# Patient Record
Sex: MALE | Race: WHITE | Employment: FULL TIME | URBAN - NONMETROPOLITAN AREA
[De-identification: names, ages, dates, MRNs, and addresses within clinical notes are randomized per-mention and may not be internally consistent; named-entity substitution may affect disease eponyms.]

---

## 2019-02-21 ENCOUNTER — HOSPITAL ENCOUNTER (EMERGENCY)
Age: 19
Discharge: HOME | End: 2019-02-21

## 2019-02-21 VITALS
BODY MASS INDEX: 24.16 KG/M2 | TEMPERATURE: 98.5 F | HEIGHT: 65 IN | DIASTOLIC BLOOD PRESSURE: 84 MMHG | OXYGEN SATURATION: 98 % | HEART RATE: 88 BPM | SYSTOLIC BLOOD PRESSURE: 137 MMHG | WEIGHT: 145 LBS | RESPIRATION RATE: 15 BRPM

## 2019-02-21 DIAGNOSIS — L02.416 ABSCESS OF LEFT THIGH: Primary | ICD-10-CM

## 2019-02-21 PROCEDURE — 99282 EMERGENCY DEPT VISIT SF MDM: CPT

## 2019-02-21 PROCEDURE — 87070 CULTURE OTHR SPECIMN AEROBIC: CPT

## 2019-02-21 PROCEDURE — 10061 I&D ABSCESS COMP/MULTIPLE: CPT | Performed by: NURSE PRACTITIONER

## 2019-02-21 PROCEDURE — 87075 CULTR BACTERIA EXCEPT BLOOD: CPT

## 2019-02-21 PROCEDURE — 99283 EMERGENCY DEPT VISIT LOW MDM: CPT | Performed by: NURSE PRACTITIONER

## 2019-02-21 PROCEDURE — 87205 SMEAR GRAM STAIN: CPT

## 2019-02-21 PROCEDURE — 10060 I&D ABSCESS SIMPLE/SINGLE: CPT

## 2019-02-21 RX ORDER — SULFAMETHOXAZOLE AND TRIMETHOPRIM 800; 160 MG/1; MG/1
1 TABLET ORAL 2 TIMES DAILY
Qty: 20 TABLET | Refills: 0 | Status: SHIPPED | OUTPATIENT
Start: 2019-02-21 | End: 2019-03-03

## 2019-02-21 RX ORDER — HYDROCODONE BITARTRATE AND ACETAMINOPHEN 5; 325 MG/1; MG/1
1 TABLET ORAL EVERY 6 HOURS PRN
Qty: 10 TABLET | Refills: 0 | Status: SHIPPED | OUTPATIENT
Start: 2019-02-21 | End: 2019-02-24

## 2019-02-21 ASSESSMENT — PAIN DESCRIPTION - LOCATION: LOCATION: GROIN

## 2019-02-21 ASSESSMENT — PAIN DESCRIPTION - PAIN TYPE: TYPE: ACUTE PAIN

## 2019-02-21 ASSESSMENT — PAIN DESCRIPTION - ORIENTATION: ORIENTATION: LEFT

## 2019-02-21 ASSESSMENT — ENCOUNTER SYMPTOMS: VOMITING: 0

## 2019-02-21 ASSESSMENT — PAIN SCALES - GENERAL: PAINLEVEL_OUTOF10: 7

## 2019-02-25 LAB
GRAM STAIN RESULT: ABNORMAL
WOUND/ABSCESS: ABNORMAL

## 2023-06-04 ENCOUNTER — HOSPITAL ENCOUNTER (EMERGENCY)
Facility: HOSPITAL | Age: 23
Discharge: HOME | End: 2023-06-04
Attending: EMERGENCY MEDICINE | Admitting: FAMILY MEDICINE

## 2023-06-04 VITALS
HEIGHT: 66 IN | BODY MASS INDEX: 26.03 KG/M2 | TEMPERATURE: 98.3 F | RESPIRATION RATE: 18 BRPM | OXYGEN SATURATION: 97 % | SYSTOLIC BLOOD PRESSURE: 111 MMHG | HEART RATE: 75 BPM | DIASTOLIC BLOOD PRESSURE: 63 MMHG | WEIGHT: 162 LBS

## 2023-06-04 DIAGNOSIS — T22.212A PARTIAL THICKNESS BURN OF LEFT FOREARM, INITIAL ENCOUNTER: ICD-10-CM

## 2023-06-04 DIAGNOSIS — T24.202A PARTIAL THICKNESS BURN OF LEFT LOWER EXTREMITY, INITIAL ENCOUNTER: Primary | ICD-10-CM

## 2023-06-04 PROCEDURE — 90471 IMMUNIZATION ADMIN: CPT | Performed by: NURSE PRACTITIONER

## 2023-06-04 PROCEDURE — 63710000001 ONDANSETRON ODT 4 MG TABLET DISPERSIBLE: Performed by: NURSE PRACTITIONER

## 2023-06-04 PROCEDURE — 90715 TDAP VACCINE 7 YRS/> IM: CPT | Performed by: NURSE PRACTITIONER

## 2023-06-04 PROCEDURE — 99283 EMERGENCY DEPT VISIT LOW MDM: CPT

## 2023-06-04 PROCEDURE — 96372 THER/PROPH/DIAG INJ SC/IM: CPT

## 2023-06-04 PROCEDURE — 0 HYDROMORPHONE 1 MG/ML SOLUTION: Performed by: NURSE PRACTITIONER

## 2023-06-04 PROCEDURE — 25010000002 TETANUS-DIPHTH-ACELL PERTUSSIS 5-2.5-18.5 LF-MCG/0.5 SUSPENSION PREFILLED SYRINGE: Performed by: NURSE PRACTITIONER

## 2023-06-04 RX ORDER — CLINDAMYCIN HYDROCHLORIDE 300 MG/1
300 CAPSULE ORAL 4 TIMES DAILY
Qty: 40 CAPSULE | Refills: 0 | Status: SHIPPED | OUTPATIENT
Start: 2023-06-04 | End: 2023-06-14

## 2023-06-04 RX ORDER — BACITRACIN ZINC AND POLYMYXIN B SULFATE 500; 10000 [USP'U]/G; [USP'U]/G
1 OINTMENT TOPICAL ONCE
Status: COMPLETED | OUTPATIENT
Start: 2023-06-04 | End: 2023-06-04

## 2023-06-04 RX ORDER — ONDANSETRON 4 MG/1
4 TABLET, ORALLY DISINTEGRATING ORAL ONCE
Status: COMPLETED | OUTPATIENT
Start: 2023-06-04 | End: 2023-06-04

## 2023-06-04 RX ORDER — BACITRACIN ZINC AND POLYMYXIN B SULFATE 500; 1000 [USP'U]/G; [USP'U]/G
1 OINTMENT TOPICAL 2 TIMES DAILY
Qty: 30 G | Refills: 0 | Status: SHIPPED | OUTPATIENT
Start: 2023-06-04

## 2023-06-04 RX ORDER — OXYCODONE HYDROCHLORIDE AND ACETAMINOPHEN 5; 325 MG/1; MG/1
1 TABLET ORAL EVERY 4 HOURS PRN
Qty: 15 TABLET | Refills: 0 | Status: SHIPPED | OUTPATIENT
Start: 2023-06-04

## 2023-06-04 RX ADMIN — TETANUS TOXOID, REDUCED DIPHTHERIA TOXOID AND ACELLULAR PERTUSSIS VACCINE, ADSORBED 0.5 ML: 5; 2.5; 8; 8; 2.5 SUSPENSION INTRAMUSCULAR at 11:58

## 2023-06-04 RX ADMIN — HYDROMORPHONE HYDROCHLORIDE 1 MG: 1 INJECTION, SOLUTION INTRAMUSCULAR; INTRAVENOUS; SUBCUTANEOUS at 11:57

## 2023-06-04 RX ADMIN — ONDANSETRON 4 MG: 4 TABLET, ORALLY DISINTEGRATING ORAL at 11:57

## 2023-06-04 RX ADMIN — BACITRACIN ZINC AND POLYMYXIN B SULFATE 1 APPLICATION: at 13:25

## 2023-06-04 NOTE — DISCHARGE INSTRUCTIONS
Clean daily; apply polysporin to the open wound, xeroform gauze and kerlix wrap, keep dry    F/u with [facility] on Tuesday 6/6/23 at 9:00 am (they want you at the clinic by 8:45
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
## 2023-06-04 NOTE — ED NOTES
Wound irrigated with sterile water. Pt tolerated this procedure well. Pts pain remains the same. Will continue to monitor at this time.

## 2023-06-04 NOTE — Clinical Note
[facility] EMERGENCY DEPARTMENT  [facility] [facility] [address]  Phone: [phone]    Yosi Her was seen and treated in our emergency department on 6/4/2023.  He may return to work on 06/07/2023.         Thank you for choosing [facility].    Emerita Chatman, APRN

## 2024-02-26 ENCOUNTER — HOSPITAL ENCOUNTER (EMERGENCY)
Facility: HOSPITAL | Age: 24
Discharge: HOME | End: 2024-02-26
Attending: EMERGENCY MEDICINE

## 2024-02-26 VITALS
WEIGHT: 162 LBS | BODY MASS INDEX: 26.03 KG/M2 | RESPIRATION RATE: 20 BRPM | SYSTOLIC BLOOD PRESSURE: 127 MMHG | OXYGEN SATURATION: 96 % | TEMPERATURE: 98.6 F | DIASTOLIC BLOOD PRESSURE: 88 MMHG | HEIGHT: 66 IN | HEART RATE: 91 BPM

## 2024-02-26 DIAGNOSIS — S61.211A LACERATION OF LEFT INDEX FINGER WITHOUT FOREIGN BODY WITHOUT DAMAGE TO NAIL, INITIAL ENCOUNTER: Primary | ICD-10-CM

## 2024-02-26 PROCEDURE — 99282 EMERGENCY DEPT VISIT SF MDM: CPT

## 2024-02-26 PROCEDURE — 90715 TDAP VACCINE 7 YRS/> IM: CPT | Performed by: EMERGENCY MEDICINE

## 2024-02-26 PROCEDURE — 25010000002 TETANUS-DIPHTH-ACELL PERTUSSIS 5-2.5-18.5 LF-MCG/0.5 SUSPENSION PREFILLED SYRINGE: Performed by: EMERGENCY MEDICINE

## 2024-02-26 PROCEDURE — 90471 IMMUNIZATION ADMIN: CPT | Performed by: EMERGENCY MEDICINE

## 2024-02-26 RX ORDER — CLINDAMYCIN HYDROCHLORIDE 300 MG/1
300 CAPSULE ORAL 4 TIMES DAILY
Qty: 40 CAPSULE | Refills: 0 | Status: SHIPPED | OUTPATIENT
Start: 2024-02-26 | End: 2024-03-07

## 2024-02-26 RX ADMIN — TETANUS TOXOID, REDUCED DIPHTHERIA TOXOID AND ACELLULAR PERTUSSIS VACCINE, ADSORBED 0.5 ML: 5; 2.5; 8; 8; 2.5 SUSPENSION INTRAMUSCULAR at 19:51

## 2024-02-27 NOTE — DISCHARGE INSTRUCTIONS
Follow up wit primary care in 48 to 72 hours for wound check and return to the emergency room immediately for any worsening of symptoms or for any other reason

## 2024-02-27 NOTE — ED PROVIDER NOTES
Subjective   History of Present Illness  Patient is 23-year-old male who presents to the emergency department complaining of a laceration at the base and dorsum of the left index finger which she did immediately prior to arrival while using an ax to chop wood at his camp site.  Denies other injury and his last known tetanus shot was at 10 years of age per his mother.  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   HENT:  Negative for ear pain, sinus pressure, sinus pain and sore throat.    Eyes:  Negative for pain, discharge and redness.   Respiratory:  Negative for cough and shortness of breath.    Cardiovascular:  Negative for chest pain and leg swelling.   Gastrointestinal:  Negative for abdominal pain, diarrhea, nausea and vomiting.   Genitourinary:  Positive for dysuria. Negative for difficulty urinating, frequency and urgency.   Musculoskeletal:  Negative for arthralgias, joint swelling, myalgias, neck pain and neck stiffness.   Skin:  Positive for wound (laceration of left index finger). Negative for pallor.   Neurological:  Negative for dizziness, weakness, light-headedness and numbness.       History reviewed. No pertinent past medical history.    Allergies   Allergen Reactions    Cefzil [Cefprozil] Unknown - High Severity     When patient was child       Past Surgical History:   Procedure Laterality Date    ADENOIDECTOMY      MASS EXCISION Right     age 11    TESTICLE SURGERY      mass removed    TONSILLECTOMY         History reviewed. No pertinent family history.    Social History     Socioeconomic History    Marital status: Single   Tobacco Use    Smoking status: Every Day     Packs/day: 0.50     Years: 10.00     Additional pack years: 0.00     Total pack years: 5.00     Types: Cigarettes    Smokeless tobacco: Never   Vaping Use    Vaping Use: Some days    Substances: Nicotine     Devices: Disposable   Substance and Sexual Activity    Alcohol use: Yes     Comment: occ    Drug use: Yes     Frequency: 7.0 times per week     Types: Marijuana    Sexual activity: Yes     Partners: Female           Objective   Physical Exam  Vitals and nursing note reviewed.   Constitutional:       General: He is not in acute distress.     Appearance: Normal appearance. He is normal weight. He is ill-appearing. He is not toxic-appearing.   HENT:      Head: Normocephalic and atraumatic.      Right Ear: External ear normal.      Left Ear: External ear normal.      Nose: No congestion or rhinorrhea.      Mouth/Throat:      Mouth: Mucous membranes are moist.      Pharynx: Oropharynx is clear. No oropharyngeal exudate or posterior oropharyngeal erythema.   Eyes:      General:         Right eye: No discharge.         Left eye: No discharge.      Extraocular Movements: Extraocular movements intact.      Conjunctiva/sclera: Conjunctivae normal.      Pupils: Pupils are equal, round, and reactive to light.   Cardiovascular:      Rate and Rhythm: Normal rate and regular rhythm.      Pulses: Normal pulses.      Heart sounds: Normal heart sounds. No murmur heard.     No friction rub. No gallop.   Pulmonary:      Effort: Pulmonary effort is normal.      Breath sounds: Normal breath sounds. No wheezing, rhonchi or rales.   Abdominal:      General: Abdomen is flat. There is no distension.      Palpations: Abdomen is soft. There is no mass.      Tenderness: There is no abdominal tenderness. There is no guarding or rebound.   Musculoskeletal:         General: Tenderness (mild with palpation over dorsal base of lef tindex finger.  distal to area of injury, patient's left index finger N/V/T intact with good cap refill <2 seconds) and signs of injury (dorsal base of lef index finger) present.      Cervical back: Normal range of motion and neck supple. No rigidity or tenderness.      Comments: No bony tenderness on examination of the  left index finger or hand   Skin:     Capillary Refill: Capillary refill takes less than 2 seconds.      Comments: Laceration of dorsum of left index finger immediately distal to the MCP joint.  0.5 cm length.  No active bleeding.  Neurovascularly intact distal to injury and patient is able to extend the index finger against resistance with motor 5/5 on extension.  Wound exdtends into SQ tissue.  No tendon, vascular or nerve involvement   Neurological:      General: No focal deficit present.      Mental Status: He is alert and oriented to person, place, and time. Mental status is at baseline.      Cranial Nerves: No cranial nerve deficit.      Sensory: No sensory deficit.      Motor: No weakness.      Gait: Gait normal.   Psychiatric:         Mood and Affect: Mood normal.         Behavior: Behavior normal.         Laceration Repair    Date/Time: 2/26/2024 7:45 PM    Performed by: Jose Cortez MD  Authorized by: Jose Cortez MD    Consent:     Consent obtained:  Verbal    Consent given by:  Patient    Risks, benefits, and alternatives were discussed: yes      Risks discussed:  Infection, pain, need for additional repair, tendon damage, nerve damage, poor wound healing and vascular damage    Alternatives discussed:  No treatment and delayed treatment  Universal protocol:     Procedure explained and questions answered to patient or proxy's satisfaction: yes      Patient identity confirmed:  Verbally with patient  Anesthesia:     Anesthesia method:  None  Laceration details:     Location:  Finger    Finger location:  L index finger    Length (cm):  0.5    Depth (mm):  0.2  Exploration:     Wound extent: no foreign bodies/material noted, no muscle damage noted, no nerve damage noted, no tendon damage noted, no underlying fracture noted and no vascular damage noted      Contaminated: yes    Treatment:     Area cleansed with:  Saline    Amount of cleaning:  Extensive    Irrigation solution:  Sterile saline     "Visualized foreign bodies/material removed: no      Debridement:  None    Undermining:  None  Skin repair:     Repair method:  Steri-Strips and tissue adhesive    Number of Steri-Strips:  2  Approximation:     Approximation:  Close  Repair type:     Repair type:  Simple  Post-procedure details:     Dressing:  Adhesive bandage    Procedure completion:  Tolerated well, no immediate complications  Comments:      Volar finger splint applied             ED Course                                             Medical Decision Making  Discussed need for suture repair and exploration of woun which patient declined.  Patient refused sutures stating he \"does not like needles.\".  Offered other option of dermabond and steristrip with finger splint which patient agreed to.  Will give tetanus update which patient agreed to.  Wound irrigated, Dermabond, Steri-Strip, and volar finger splint applied.    Problems Addressed:  Laceration of left index finger without foreign body without damage to nail, initial encounter: complicated acute illness or injury    Risk  Prescription drug management.        Final diagnoses:   None       ED Disposition  ED Disposition       None            No follow-up provider specified.       Medication List      No changes were made to your prescriptions during this visit.            Jose Cortez MD  03/01/24 1513    "